# Patient Record
Sex: MALE | Race: BLACK OR AFRICAN AMERICAN | NOT HISPANIC OR LATINO | Employment: FULL TIME | URBAN - METROPOLITAN AREA
[De-identification: names, ages, dates, MRNs, and addresses within clinical notes are randomized per-mention and may not be internally consistent; named-entity substitution may affect disease eponyms.]

---

## 2020-07-17 ENCOUNTER — OFFICE VISIT (OUTPATIENT)
Dept: ORTHOPEDIC SURGERY | Facility: CLINIC | Age: 50
End: 2020-07-17

## 2020-07-17 VITALS — OXYGEN SATURATION: 100 % | HEIGHT: 71 IN | BODY MASS INDEX: 22.93 KG/M2 | WEIGHT: 163.8 LBS | HEART RATE: 61 BPM

## 2020-07-17 DIAGNOSIS — M25.512 LEFT SHOULDER PAIN, UNSPECIFIED CHRONICITY: Primary | ICD-10-CM

## 2020-07-17 PROCEDURE — 99203 OFFICE O/P NEW LOW 30 MIN: CPT | Performed by: ORTHOPAEDIC SURGERY

## 2020-07-17 RX ORDER — LISINOPRIL 20 MG/1
20 TABLET ORAL DAILY
COMMUNITY

## 2020-07-17 RX ORDER — IBUPROFEN 600 MG/1
600 TABLET ORAL EVERY 6 HOURS
COMMUNITY
Start: 2020-06-30

## 2020-07-17 RX ORDER — CYCLOBENZAPRINE HCL 5 MG
5 TABLET ORAL 3 TIMES DAILY
COMMUNITY
Start: 2020-06-30

## 2020-07-17 RX ORDER — HYDROCODONE BITARTRATE AND ACETAMINOPHEN 5; 325 MG/1; MG/1
1 TABLET ORAL EVERY 6 HOURS PRN
COMMUNITY
End: 2020-09-04

## 2020-07-17 NOTE — PROGRESS NOTES
[facility] [place] [facility] Note    Subjective     Chief Complaint   Patient presents with   • Left Shoulder - Pain        HPI  Neo Islas is a 49 y.o. male who presents with left shoulder pain.  Onset: twisting injury. The issue has been ongoing for 3 week(s). Pain is a 6/10 on the pain scale. Pain is described as throbbing. Associated symptoms include pain, swelling and stiffness. The pain is worse with walking, standing, sitting, sleeping, working and leisure; resting and pain medication and/or NSAID improve the pain. Previous treatments have included: NSAIDS.    I have reviewed the following portions of the patient's history:History of Present Illness        He injured it at work on June 27 and then also repetitive motion he says.    Past Medical History:   Diagnosis Date   • Diabetes (CMS/[facility])    • Hypertension       History reviewed. No pertinent surgical history.   Family History   Problem Relation Age of Onset   • Hypertension Mother    • Diabetes Mother    • Heart attack Mother    • Cancer Father    • Hypertension Father    • Diabetes Father      Social History     Socioeconomic History   • Marital status:      Spouse name: Not on file   • Number of children: Not on file   • Years of education: Not on file   • Highest education level: Not on file   Tobacco Use   • Smoking status: Current Every Day Smoker     Packs/day: 1.00     Types: Cigarettes     Start date: 1990   • Smokeless tobacco: Never Used   Substance and Sexual Activity   • Alcohol use: Never     Frequency: Never   • Drug use: Never   • Sexual activity: Defer      Current Outpatient Medications on File Prior to Visit   Medication Sig Dispense Refill   • cyclobenzaprine (FLEXERIL) 5 MG tablet Take 5 mg by mouth 3 (Three) Times a Day.     • HYDROcodone-acetaminophen (NORCO) 5-325 MG per tablet Take 1 tablet by mouth Every 6 (Six) Hours As Needed.     • ibuprofen (ADVIL,MOTRIN) 600 MG tablet Take 600 mg by mouth Every 6 (Six) Hours.    "  • lisinopril (PRINIVIL,ZESTRIL) 20 MG tablet Take 20 mg by mouth Daily.     • metFORMIN (GLUCOPHAGE) 1000 MG tablet Take 1,000 mg by mouth 2 (Two) Times a Day With Meals.       No current facility-administered medications on file prior to visit.       No Known Allergies     The following portions of the patient's history were reviewed and updated as appropriate: allergies, current medications, past family history, past medical history, past social history, past surgical history and problem list.    Review of Systems   Constitutional: Positive for activity change.   HENT: Negative.    Eyes: Negative.    Respiratory: Negative.    Cardiovascular: Negative.    Gastrointestinal: Negative.    Endocrine: Negative.    Genitourinary: Negative.    Musculoskeletal: Positive for arthralgias.   Skin: Negative.    Allergic/Immunologic: Negative.    Neurological: Negative.    Hematological: Negative.    Psychiatric/Behavioral: Negative.         Objective      Physical Exam  Pulse 61   Ht 181 cm (71.26\")   Wt 74.3 kg (163 lb 12.8 oz)   SpO2 100%   BMI 22.68 kg/m²     Body mass index is 22.68 kg/m².    GENERAL APPEARANCE: awake, alert & oriented x 3, in no acute distress and well developed, well nourished  PSYCH: normal mood and affect  LUNGS:  breathing nonlabored, no wheezing  EYES: sclera anicteric, pupils equal  CARDIOVASCULAR: palpable pulses dorsalis pedis, palpable posterior tibial bilaterally. Capillary refill less than 2 seconds  INTEGUMENTARY: skin intact, no clubbing, cyanosis  NEUROLOGIC:  Normal Sensation and reflexes       Ortho Exam  Musculoskeletal   Upper Extremity   Left Shoulder     Inspection and Palpation:     Tenderness - none     Crepitus - none    Sensation is normal    Examination reveals no ecchymosis.      Strength and Tone:    Supraspinatus, Infraspinatus - 4/5    Subscapularis - 5/5    Deltoid - 5/5   Range of Motion   Right shoulder:    Internal Rotation: ROM - T7    External Rotation: AROM - 80 " degrees    Elevation through flexion: AROM - 180 degrees     Abduction - 180   Left Shoulder:    Internal Rotation: ROM - T7    External Rotation: AROM - 80 degrees    Elevation through flexion: AROM - 180 degrees     Abduction - 180 degrees   Instability   Left shoulder    Sulcus sign negative    Apprehension test negative    Kalyani relocation test negative    Jerk test negative   Impingement   Left shoulder    Alfaro impingement test positive    Neer impingement test positive   Functional Testing   Left shoulder    AC crossover adduction test negative    Abdominal compression test negative    Lift-off sign negative    Speed's test negative    Liang's test negative    Horriblower's sign negative     Imaging/Studies  Imaging Results (Last 7 Days)     ** No results found for the last 168 hours. **        I viewed his x-rays from June 29 which are negative and his MRI from July 6 which shows a partial cuff tear and calcific tendinitis  Assessment/Plan        ICD-10-CM ICD-9-CM   1. Left shoulder pain, unspecified chronicity M25.512 719.41       Orders Placed This Encounter   Procedures   • Ambulatory Referral to Physical Therapy      He will go to physical therapy in [place].  I will order some Voltaren gel.  His work restrictions are no use left arm.  He works as a .  I will see her back in 4 weeks.  I offered him a cortisone shot but he does not want one.  Medical Decision Making  Management Options : prescription/IM medicine and physical/occupational therapy  Data/Risk: radiology tests and independent visualization of imaging, lab tests, or EMG/NCV    Yoni Cazares MD  07/17/20  11:24         EMR Dragon/Transcription disclaimer:  Much of this encounter note is an electronic transcription of spoken language to printed text. Electronic transcription of spoken language may permit erroneous, or at times, nonsensical words or phrases to be inadvertently transcribed. Although I have reviewed the note for  such errors, some may still exist.

## 2020-07-27 ENCOUNTER — TELEPHONE (OUTPATIENT)
Dept: ORTHOPEDIC SURGERY | Facility: CLINIC | Age: 50
End: 2020-07-27

## 2020-07-27 NOTE — TELEPHONE ENCOUNTER
PATIENT CALLED REGARDING PAIN MEDICATION. HE SAID HE CALLED HIS PRIMARY DOCTOR FOR A REFILL OF NORCO BUT THEY WILL NO LONGER PRESCRIBE IT BECAUSE DR MÁRQUEZ IS CARING FOR HIM NOW. PATIENT WANTS TO KNOW IF DR MÁRQUEZ CAN PRESCRIBE HIS MEDICATION NOW SINCE HIS PRIMARY WILL NOT. PATIENT CAN BE REACHED [phone].

## 2020-07-27 NOTE — TELEPHONE ENCOUNTER
PATIENT CALLED BACK & IS REQUESTING REGINALD TO CALL HIM BACK [phone]. HE PREVIOUSLY GAVE HIS WIFE'S PHONE NUMBER.

## 2020-07-28 NOTE — TELEPHONE ENCOUNTER
Patient called back in this morning. I let him know that Per Dr Cazares, he will not prescribe the Norco for him long term. That is typically only something that we prescribe after surgery for a short amount of time. He understood and will call his primary care back to get them to fill it for him. He had no further problems or questions at this time     Allison

## 2020-08-14 ENCOUNTER — OFFICE VISIT (OUTPATIENT)
Dept: ORTHOPEDIC SURGERY | Facility: CLINIC | Age: 50
End: 2020-08-14

## 2020-08-14 VITALS — BODY MASS INDEX: 22.93 KG/M2 | HEART RATE: 80 BPM | OXYGEN SATURATION: 99 % | HEIGHT: 71 IN | WEIGHT: 163.8 LBS

## 2020-08-14 DIAGNOSIS — M25.512 LEFT SHOULDER PAIN, UNSPECIFIED CHRONICITY: Primary | ICD-10-CM

## 2020-08-14 PROCEDURE — 99212 OFFICE O/P EST SF 10 MIN: CPT | Performed by: ORTHOPAEDIC SURGERY

## 2020-08-14 NOTE — PROGRESS NOTES
[facility] [place] [facility] Note    Subjective     Chief Complaint   Patient presents with   • Follow-up     1 month follow up - Left shoulder pain, unspecified chronicity        HPI  Neo Islas is a 49 y.o. male.  He says he is doing better.  He only had 6 sessions of physical therapy.  Pain is 3 out of 10.    Past Medical History:   Diagnosis Date   • Diabetes (CMS/HCC)    • Hypertension       No past surgical history on file.   Family History   Problem Relation Age of Onset   • Hypertension Mother    • Diabetes Mother    • Heart attack Mother    • Cancer Father    • Hypertension Father    • Diabetes Father      Social History     Socioeconomic History   • Marital status:      Spouse name: Not on file   • Number of children: Not on file   • Years of education: Not on file   • Highest education level: Not on file   Tobacco Use   • Smoking status: Current Every Day Smoker     Packs/day: 1.00     Types: Cigarettes     Start date: 1990   • Smokeless tobacco: Never Used   Substance and Sexual Activity   • Alcohol use: Never     Frequency: Never   • Drug use: Never   • Sexual activity: Defer      Current Outpatient Medications on File Prior to Visit   Medication Sig Dispense Refill   • cyclobenzaprine (FLEXERIL) 5 MG tablet Take 5 mg by mouth 3 (Three) Times a Day.     • diclofenac (VOLTAREN) 1 % gel gel Apply 4 g topically to the appropriate area as directed 2 (Two) Times a Day. 1 tube 5   • HYDROcodone-acetaminophen (NORCO) 5-325 MG per tablet Take 1 tablet by mouth Every 6 (Six) Hours As Needed.     • ibuprofen (ADVIL,MOTRIN) 600 MG tablet Take 600 mg by mouth Every 6 (Six) Hours.     • lisinopril (PRINIVIL,ZESTRIL) 20 MG tablet Take 20 mg by mouth Daily.     • metFORMIN (GLUCOPHAGE) 1000 MG tablet Take 1,000 mg by mouth 2 (Two) Times a Day With Meals.       No current facility-administered medications on file prior to visit.       No Known Allergies     The following portions of the patient's history  "were reviewed and updated as appropriate: allergies, current medications, past family history, past medical history, past social history, past surgical history and problem list.    Review of Systems   Constitutional: Negative.    HENT: Negative.    Eyes: Negative.    Respiratory: Negative.    Cardiovascular: Negative.    Gastrointestinal: Negative.    Endocrine: Negative.    Genitourinary: Negative.    Musculoskeletal: Positive for arthralgias and joint swelling.   Skin: Negative.    Allergic/Immunologic: Negative.    Neurological: Negative.    Hematological: Negative.    Psychiatric/Behavioral: Negative.         Objective      Physical Exam  Pulse 80   Ht 181 cm (71.26\")   Wt 74.3 kg (163 lb 12.8 oz)   SpO2 99%   BMI 22.68 kg/m²     Body mass index is 22.68 kg/m².    GENERAL APPEARANCE: awake, alert & oriented x 3, in no acute distress and well developed, well nourished  PSYCH: normal mood and affect  LUNGS:  breathing nonlabored, no wheezing  Left shoulder has full motion.  Positive impingement.  4+ out of 5 strength.    Imaging/Studies  Imaging Results (Last 7 Days)     ** No results found for the last 168 hours. **          Assessment/Plan        ICD-10-CM ICD-9-CM   1. Left shoulder pain, unspecified chronicity M25.512 719.41       Orders Placed This Encounter   Procedures   • Ambulatory Referral to Physical Therapy      He will continue physical therapy.  Follow-up in 3 weeks.  May return to work September 1.  Medical Decision Making  Management Options : over-the-counter medicine and physical/occupational therapy      Yoni Cazares MD  08/14/20  11:35         EMR Dragon/Transcription disclaimer:  Much of this encounter note is an electronic transcription of spoken language to printed text. Electronic transcription of spoken language may permit erroneous, or at times, nonsensical words or phrases to be inadvertently transcribed. Although I have reviewed the note for such errors, some may still " exist.

## 2020-08-17 ENCOUNTER — TELEPHONE (OUTPATIENT)
Dept: ORTHOPEDIC SURGERY | Facility: CLINIC | Age: 50
End: 2020-08-17

## 2020-09-04 ENCOUNTER — OFFICE VISIT (OUTPATIENT)
Dept: ORTHOPEDIC SURGERY | Facility: CLINIC | Age: 50
End: 2020-09-04

## 2020-09-04 VITALS — HEIGHT: 71 IN | HEART RATE: 86 BPM | WEIGHT: 163.8 LBS | BODY MASS INDEX: 22.93 KG/M2 | OXYGEN SATURATION: 99 %

## 2020-09-04 DIAGNOSIS — M25.512 LEFT SHOULDER PAIN, UNSPECIFIED CHRONICITY: Primary | ICD-10-CM

## 2020-09-04 PROCEDURE — 99212 OFFICE O/P EST SF 10 MIN: CPT | Performed by: ORTHOPAEDIC SURGERY

## 2020-09-04 NOTE — PROGRESS NOTES
[facility] [place] [facility] Note    Subjective     Chief Complaint   Patient presents with   • Follow-up     3 week follow up; Left shoulder pain        HPI  Neo Islas is a 50 y.o. male.  His left shoulder is doing better.  He has been doing physical therapy.  He believes he can return to work full duty.    Past Medical History:   Diagnosis Date   • Diabetes (CMS/HCC)    • Hypertension       History reviewed. No pertinent surgical history.   Family History   Problem Relation Age of Onset   • Hypertension Mother    • Diabetes Mother    • Heart attack Mother    • Cancer Father    • Hypertension Father    • Diabetes Father      Social History     Socioeconomic History   • Marital status:      Spouse name: Not on file   • Number of children: Not on file   • Years of education: Not on file   • Highest education level: Not on file   Tobacco Use   • Smoking status: Current Every Day Smoker     Packs/day: 1.00     Types: Cigarettes     Start date: 1990   • Smokeless tobacco: Never Used   Substance and Sexual Activity   • Alcohol use: Never     Frequency: Never   • Drug use: Never   • Sexual activity: Defer      Current Outpatient Medications on File Prior to Visit   Medication Sig Dispense Refill   • cyclobenzaprine (FLEXERIL) 5 MG tablet Take 5 mg by mouth 3 (Three) Times a Day.     • diclofenac (VOLTAREN) 1 % gel gel Apply 4 g topically to the appropriate area as directed 2 (Two) Times a Day. 1 tube 5   • ibuprofen (ADVIL,MOTRIN) 600 MG tablet Take 600 mg by mouth Every 6 (Six) Hours.     • lisinopril (PRINIVIL,ZESTRIL) 20 MG tablet Take 20 mg by mouth Daily.     • metFORMIN (GLUCOPHAGE) 1000 MG tablet Take 1,000 mg by mouth 2 (Two) Times a Day With Meals.     • [DISCONTINUED] HYDROcodone-acetaminophen (NORCO) 5-325 MG per tablet Take 1 tablet by mouth Every 6 (Six) Hours As Needed.       No current facility-administered medications on file prior to visit.       No Known Allergies     The following  "portions of the patient's history were reviewed and updated as appropriate: allergies, current medications, past family history, past medical history, past social history, past surgical history and problem list.    Review of Systems   Constitutional: Negative.    HENT: Negative.    Eyes: Negative.    Respiratory: Negative.    Cardiovascular: Negative.    Gastrointestinal: Negative.    Endocrine: Negative.    Genitourinary: Negative.    Musculoskeletal: Positive for arthralgias.   Skin: Negative.    Allergic/Immunologic: Negative.    Neurological: Negative.    Hematological: Negative.    Psychiatric/Behavioral: Negative.         Objective      Physical Exam  Pulse 86   Ht 181 cm (71.26\")   Wt 74.3 kg (163 lb 12.8 oz)   SpO2 99%   BMI 22.68 kg/m²     Body mass index is 22.68 kg/m².    GENERAL APPEARANCE: awake, alert & oriented x 3, in no acute distress and well developed, well nourished  PSYCH: normal mood and affect  LUNGS:  breathing nonlabored, no wheezing  Left shoulder with full motion and full strength  Imaging/Studies  Imaging Results (Last 7 Days)     ** No results found for the last 168 hours. **          Assessment/Plan        ICD-10-CM ICD-9-CM   1. Left shoulder pain, unspecified chronicity M25.512 719.41     He may return to work full duty without restriction on Tuesday, September 8.  I will see him back as needed.  Medical Decision Making  Management Options : over-the-counter medicine    Yoni Cazares MD  09/04/20  11:31         EMR Dragon/Transcription disclaimer:  Much of this encounter note is an electronic transcription of spoken language to printed text. Electronic transcription of spoken language may permit erroneous, or at times, nonsensical words or phrases to be inadvertently transcribed. Although I have reviewed the note for such errors, some may still exist.      "

## 2024-10-22 ENCOUNTER — APPOINTMENT (OUTPATIENT)
Dept: CARDIOLOGY | Facility: HOSPITAL | Age: 54
End: 2024-10-22
Payer: MEDICAID

## 2024-10-22 ENCOUNTER — APPOINTMENT (OUTPATIENT)
Dept: GENERAL RADIOLOGY | Facility: HOSPITAL | Age: 54
End: 2024-10-22
Payer: MEDICAID

## 2024-10-22 ENCOUNTER — HOSPITAL ENCOUNTER (EMERGENCY)
Facility: HOSPITAL | Age: 54
Discharge: LEFT BEFORE DISCHARGE | End: 2024-10-22
Attending: EMERGENCY MEDICINE | Admitting: EMERGENCY MEDICINE
Payer: MEDICAID

## 2024-10-22 VITALS
TEMPERATURE: 97.8 F | HEART RATE: 92 BPM | WEIGHT: 177 LBS | SYSTOLIC BLOOD PRESSURE: 149 MMHG | BODY MASS INDEX: 23.98 KG/M2 | DIASTOLIC BLOOD PRESSURE: 100 MMHG | OXYGEN SATURATION: 97 % | RESPIRATION RATE: 14 BRPM | HEIGHT: 72 IN

## 2024-10-22 DIAGNOSIS — D16.9 OSTEOCHONDROMA: ICD-10-CM

## 2024-10-22 DIAGNOSIS — Z53.29 LEFT AGAINST MEDICAL ADVICE: ICD-10-CM

## 2024-10-22 DIAGNOSIS — M79.604 PAIN OF RIGHT LOWER EXTREMITY: Primary | ICD-10-CM

## 2024-10-22 DIAGNOSIS — I70.209 SUPERFICIAL FEMORAL ARTERY OCCLUSION: ICD-10-CM

## 2024-10-22 LAB
ALBUMIN SERPL-MCNC: 3.1 G/DL (ref 3.5–5.2)
ALBUMIN/GLOB SERPL: 0.9 G/DL
ALP SERPL-CCNC: 124 U/L (ref 39–117)
ALT SERPL W P-5'-P-CCNC: 15 U/L (ref 1–41)
ANION GAP SERPL CALCULATED.3IONS-SCNC: 10 MMOL/L (ref 5–15)
AST SERPL-CCNC: 17 U/L (ref 1–40)
BASOPHILS # BLD AUTO: 0.07 10*3/MM3 (ref 0–0.2)
BASOPHILS NFR BLD AUTO: 0.7 % (ref 0–1.5)
BH CV GRAFT BRACHIAL PRESSURE LEFT: NORMAL MMHG
BH CV GRAFT BRACHIAL PRESSURE RIGHT: NORMAL MMHG
BH CV LEA LEFT DPA PRESSURE: 96 MMHG
BH CV LEA LEFT PTA PRESSURE: 114 MMHG
BH CV LEA RIGHT ANT TIBIAL A MID PSV: 19 CM/S
BH CV LEA RIGHT ANT TIBIAL A PROX PSV: 16.3 CM/S
BH CV LEA RIGHT CFA DISTAL PSV: 59.87 CM/S
BH CV LEA RIGHT CFA PROX PSV: 66.7 CM/S
BH CV LEA RIGHT DFA PROX PSV: 143.9 CM/S
BH CV LEA RIGHT DPA PRESSURE: NORMAL MMHG
BH CV LEA RIGHT PERONEAL  MID PSV: 11.9 CM/S
BH CV LEA RIGHT POPITEAL A  DISTAL PSV: 17.6 CM/S
BH CV LEA RIGHT POPITEAL A  PROX PSV: 13.8 CM/S
BH CV LEA RIGHT PTA DISTAL PSV: 22.3 CM/S
BH CV LEA RIGHT PTA MID PSV: 12.7 CM/S
BH CV LEA RIGHT PTA PRESSURE: 90 MMHG
BH CV LEA RIGHT PTA PROX PSV: 14 CM/S
BH CV LEA RIGHT SFA DISTAL PSV: 15.1 CM/S
BH CV LEA RIGHT SFA PROX PSV: 14.9 CM/S
BH CV LEA RIGHT TIBEOPERONEAL PSV: 21.93 CM/S
BH CV LOWER ARTERIAL LEFT ABI RATIO: 0.74
BH CV LOWER ARTERIAL RIGHT ABI RATIO: 0.58
BH CV LOWER VASCULAR RIGHT COMMON FEMORAL AUGMENT: NORMAL
BH CV LOWER VASCULAR RIGHT COMMON FEMORAL COMPRESS: NORMAL
BH CV LOWER VASCULAR RIGHT COMMON FEMORAL PHASIC: NORMAL
BH CV LOWER VASCULAR RIGHT COMMON FEMORAL SPONT: NORMAL
BH CV LOWER VASCULAR RIGHT DISTAL FEMORAL AUGMENT: NORMAL
BH CV LOWER VASCULAR RIGHT DISTAL FEMORAL COMPRESS: NORMAL
BH CV LOWER VASCULAR RIGHT DISTAL FEMORAL PHASIC: NORMAL
BH CV LOWER VASCULAR RIGHT DISTAL FEMORAL SPONT: NORMAL
BH CV LOWER VASCULAR RIGHT GASTRONEMIUS COMPRESS: NORMAL
BH CV LOWER VASCULAR RIGHT GREATER SAPH AK COMPRESS: NORMAL
BH CV LOWER VASCULAR RIGHT GREATER SAPH BK COMPRESS: NORMAL
BH CV LOWER VASCULAR RIGHT LESSER SAPH COMPRESS: NORMAL
BH CV LOWER VASCULAR RIGHT MID FEMORAL AUGMENT: NORMAL
BH CV LOWER VASCULAR RIGHT MID FEMORAL COMPRESS: NORMAL
BH CV LOWER VASCULAR RIGHT MID FEMORAL PHASIC: NORMAL
BH CV LOWER VASCULAR RIGHT MID FEMORAL SPONT: NORMAL
BH CV LOWER VASCULAR RIGHT PERONEAL AUGMENT: NORMAL
BH CV LOWER VASCULAR RIGHT PERONEAL COMPRESS: NORMAL
BH CV LOWER VASCULAR RIGHT POPLITEAL AUGMENT: NORMAL
BH CV LOWER VASCULAR RIGHT POPLITEAL COMPRESS: NORMAL
BH CV LOWER VASCULAR RIGHT POPLITEAL PHASIC: NORMAL
BH CV LOWER VASCULAR RIGHT POPLITEAL SPONT: NORMAL
BH CV LOWER VASCULAR RIGHT POSTERIOR TIBIAL AUGMENT: NORMAL
BH CV LOWER VASCULAR RIGHT POSTERIOR TIBIAL COMPRESS: NORMAL
BH CV LOWER VASCULAR RIGHT PROFUNDA FEMORAL AUGMENT: NORMAL
BH CV LOWER VASCULAR RIGHT PROFUNDA FEMORAL PHASIC: NORMAL
BH CV LOWER VASCULAR RIGHT PROFUNDA FEMORAL SPONT: NORMAL
BH CV LOWER VASCULAR RIGHT PROXIMAL FEMORAL AUGMENT: NORMAL
BH CV LOWER VASCULAR RIGHT PROXIMAL FEMORAL COMPRESS: NORMAL
BH CV LOWER VASCULAR RIGHT PROXIMAL FEMORAL PHASIC: NORMAL
BH CV LOWER VASCULAR RIGHT PROXIMAL FEMORAL SPONT: NORMAL
BH CV LOWER VASCULAR RIGHT SAPHENOFEMORAL JUNCTION AUGMENT: NORMAL
BH CV LOWER VASCULAR RIGHT SAPHENOFEMORAL JUNCTION COMPRESS: NORMAL
BH CV LOWER VASCULAR RIGHT SAPHENOFEMORAL JUNCTION PHASIC: NORMAL
BH CV LOWER VASCULAR RIGHT SAPHENOFEMORAL JUNCTION SPONT: NORMAL
BILIRUB SERPL-MCNC: <0.2 MG/DL (ref 0–1.2)
BUN SERPL-MCNC: 6 MG/DL (ref 6–20)
BUN/CREAT SERPL: 6.5 (ref 7–25)
CALCIUM SPEC-SCNC: 9.1 MG/DL (ref 8.6–10.5)
CHLORIDE SERPL-SCNC: 98 MMOL/L (ref 98–107)
CO2 SERPL-SCNC: 29 MMOL/L (ref 22–29)
CREAT SERPL-MCNC: 0.93 MG/DL (ref 0.76–1.27)
CRP SERPL-MCNC: 1.42 MG/DL (ref 0–0.5)
D-LACTATE SERPL-SCNC: 1.8 MMOL/L (ref 0.5–2)
DEPRECATED RDW RBC AUTO: 37.4 FL (ref 37–54)
EGFRCR SERPLBLD CKD-EPI 2021: 98.8 ML/MIN/1.73
EOSINOPHIL # BLD AUTO: 0.12 10*3/MM3 (ref 0–0.4)
EOSINOPHIL NFR BLD AUTO: 1.3 % (ref 0.3–6.2)
ERYTHROCYTE [DISTWIDTH] IN BLOOD BY AUTOMATED COUNT: 13.2 % (ref 12.3–15.4)
ERYTHROCYTE [SEDIMENTATION RATE] IN BLOOD: 84 MM/HR (ref 0–20)
GLOBULIN UR ELPH-MCNC: 3.6 GM/DL
GLUCOSE SERPL-MCNC: 200 MG/DL (ref 65–99)
HCT VFR BLD AUTO: 41.2 % (ref 37.5–51)
HGB BLD-MCNC: 13.1 G/DL (ref 13–17.7)
IMM GRANULOCYTES # BLD AUTO: 0.19 10*3/MM3 (ref 0–0.05)
IMM GRANULOCYTES NFR BLD AUTO: 2 % (ref 0–0.5)
LIPASE SERPL-CCNC: 31 U/L (ref 13–60)
LYMPHOCYTES # BLD AUTO: 2.14 10*3/MM3 (ref 0.7–3.1)
LYMPHOCYTES NFR BLD AUTO: 22.3 % (ref 19.6–45.3)
MCH RBC QN AUTO: 25 PG (ref 26.6–33)
MCHC RBC AUTO-ENTMCNC: 31.8 G/DL (ref 31.5–35.7)
MCV RBC AUTO: 78.6 FL (ref 79–97)
MONOCYTES # BLD AUTO: 0.44 10*3/MM3 (ref 0.1–0.9)
MONOCYTES NFR BLD AUTO: 4.6 % (ref 5–12)
NEUTROPHILS NFR BLD AUTO: 6.64 10*3/MM3 (ref 1.7–7)
NEUTROPHILS NFR BLD AUTO: 69.1 % (ref 42.7–76)
NRBC BLD AUTO-RTO: 0 /100 WBC (ref 0–0.2)
PLATELET # BLD AUTO: 648 10*3/MM3 (ref 140–450)
PMV BLD AUTO: 8 FL (ref 6–12)
POTASSIUM SERPL-SCNC: 4.2 MMOL/L (ref 3.5–5.2)
PROCALCITONIN SERPL-MCNC: 0.1 NG/ML (ref 0–0.25)
PROT SERPL-MCNC: 6.7 G/DL (ref 6–8.5)
RBC # BLD AUTO: 5.24 10*6/MM3 (ref 4.14–5.8)
RIGHT GROIN CFA SYS: 59.9 CM/SEC
SODIUM SERPL-SCNC: 137 MMOL/L (ref 136–145)
WBC NRBC COR # BLD AUTO: 9.6 10*3/MM3 (ref 3.4–10.8)

## 2024-10-22 PROCEDURE — 73560 X-RAY EXAM OF KNEE 1 OR 2: CPT

## 2024-10-22 PROCEDURE — 86140 C-REACTIVE PROTEIN: CPT | Performed by: NURSE PRACTITIONER

## 2024-10-22 PROCEDURE — 93971 EXTREMITY STUDY: CPT

## 2024-10-22 PROCEDURE — 83690 ASSAY OF LIPASE: CPT | Performed by: NURSE PRACTITIONER

## 2024-10-22 PROCEDURE — 63710000001 ONDANSETRON ODT 4 MG TABLET DISPERSIBLE: Performed by: EMERGENCY MEDICINE

## 2024-10-22 PROCEDURE — 36415 COLL VENOUS BLD VENIPUNCTURE: CPT

## 2024-10-22 PROCEDURE — 93926 LOWER EXTREMITY STUDY: CPT | Performed by: INTERNAL MEDICINE

## 2024-10-22 PROCEDURE — 80053 COMPREHEN METABOLIC PANEL: CPT | Performed by: NURSE PRACTITIONER

## 2024-10-22 PROCEDURE — 83605 ASSAY OF LACTIC ACID: CPT | Performed by: NURSE PRACTITIONER

## 2024-10-22 PROCEDURE — 99284 EMERGENCY DEPT VISIT MOD MDM: CPT

## 2024-10-22 PROCEDURE — 93926 LOWER EXTREMITY STUDY: CPT

## 2024-10-22 PROCEDURE — 93971 EXTREMITY STUDY: CPT | Performed by: INTERNAL MEDICINE

## 2024-10-22 PROCEDURE — 84145 PROCALCITONIN (PCT): CPT | Performed by: NURSE PRACTITIONER

## 2024-10-22 PROCEDURE — 85025 COMPLETE CBC W/AUTO DIFF WBC: CPT | Performed by: NURSE PRACTITIONER

## 2024-10-22 PROCEDURE — 85652 RBC SED RATE AUTOMATED: CPT | Performed by: NURSE PRACTITIONER

## 2024-10-22 RX ORDER — HYDROCODONE BITARTRATE AND ACETAMINOPHEN 5; 325 MG/1; MG/1
1 TABLET ORAL ONCE
Status: COMPLETED | OUTPATIENT
Start: 2024-10-22 | End: 2024-10-22

## 2024-10-22 RX ORDER — ONDANSETRON 4 MG/1
4 TABLET, ORALLY DISINTEGRATING ORAL ONCE
Status: COMPLETED | OUTPATIENT
Start: 2024-10-22 | End: 2024-10-22

## 2024-10-22 RX ADMIN — ONDANSETRON 4 MG: 4 TABLET, ORALLY DISINTEGRATING ORAL at 17:09

## 2024-10-22 RX ADMIN — HYDROCODONE BITARTRATE AND ACETAMINOPHEN 1 TABLET: 5; 325 TABLET ORAL at 17:09

## 2024-10-22 NOTE — ED PROVIDER NOTES
EMERGENCY DEPARTMENT ENCOUNTER    Pt Name: Neo Islas  MRN: [id number]  Pt :   1970  Room Number:    Date of encounter:  10/22/2024  PCP: Garth Robbins MD  ED Provider: DONNA Negro    Historian: Patient, spouse      HPI:  Chief Complaint: Right lower extremity pain        Context: Neo Islas is a 54 y.o. male who presents to the ED c/o right lower extremity pain since 10 days ago.  Patient states pain constant, moderate to severe, initiates at the medial knee to the medial upper thigh.  Pain is worse at night and keeps him awake.  He started to use a cane to help with ambulation.  Reports recent hospital admission to hospital in [place] for pancreatitis.  Pain leg started just before discharge from the hospital, US was negative for DVT  He reports no injury.  Had fever over weekend.  Abdominal pain significantly improved and very mild.  Reports no recent alcohol use.  He is a current smoker      PAST MEDICAL HISTORY  Past Medical History:   Diagnosis Date    Diabetes     Hypertension          PAST SURGICAL HISTORY  History reviewed. No pertinent surgical history.      FAMILY HISTORY  Family History   Problem Relation Age of Onset    Hypertension Mother     Diabetes Mother     Heart attack Mother     Cancer Father     Hypertension Father     Diabetes Father          SOCIAL HISTORY  Social History     Socioeconomic History    Marital status:    Tobacco Use    Smoking status: Every Day     Current packs/day: 1.00     Average packs/day: 1 pack/day for 34.8 years (34.8 ttl pk-yrs)     Types: Cigarettes     Start date:     Smokeless tobacco: Never   Vaping Use    Vaping status: Never Used   Substance and Sexual Activity    Alcohol use: Never    Drug use: Never    Sexual activity: Defer         ALLERGIES  Patient has no known allergies.        REVIEW OF SYSTEMS  Review of Systems       All systems reviewed and negative except for those discussed in HPI.       PHYSICAL EXAM    I  have reviewed the triage vital signs and nursing notes.    ED Triage Vitals [10/22/24 1323]   Temp Heart Rate Resp BP SpO2   97.8 °F (36.6 °C) 92 14 149/100 97 %      Temp src Heart Rate Source Patient Position BP Location FiO2 (%)   Oral Monitor Sitting Left arm --       Physical Exam  GENERAL:   Appears in no acute distress.   HENT: Nares patent.  EYES: No scleral icterus.  CV: Regular rhythm, regular rate.  RESPIRATORY: Normal effort.  No audible wheezes, rales or rhonchi.  ABDOMEN: Soft, nontender  MUSCULOSKELETAL: No deformities.  Tenderness on palpation right medial thigh, no crepitus, no rash, no palpable cord.  Distal pulses are present, femoral pulse present, no erythema to the right knee.  NEURO: Alert, moves all extremities, follows commands.  SKIN: Warm, dry, no rash visualized.      LAB RESULTS  Recent Results (from the past 24 hours)   CBC Auto Differential    Collection Time: 10/22/24  1:57 PM    Specimen: Blood   Result Value Ref Range    WBC 9.60 3.40 - 10.80 10*3/mm3    RBC 5.24 4.14 - 5.80 10*6/mm3    Hemoglobin 13.1 13.0 - 17.7 g/dL    Hematocrit 41.2 37.5 - 51.0 %    MCV 78.6 (L) 79.0 - 97.0 fL    MCH 25.0 (L) 26.6 - 33.0 pg    MCHC 31.8 31.5 - 35.7 g/dL    RDW 13.2 12.3 - 15.4 %    RDW-SD 37.4 37.0 - 54.0 fl    MPV 8.0 6.0 - 12.0 fL    Platelets 648 (H) 140 - 450 10*3/mm3    Neutrophil % 69.1 42.7 - 76.0 %    Lymphocyte % 22.3 19.6 - 45.3 %    Monocyte % 4.6 (L) 5.0 - 12.0 %    Eosinophil % 1.3 0.3 - 6.2 %    Basophil % 0.7 0.0 - 1.5 %    Immature Grans % 2.0 (H) 0.0 - 0.5 %    Neutrophils, Absolute 6.64 1.70 - 7.00 10*3/mm3    Lymphocytes, Absolute 2.14 0.70 - 3.10 10*3/mm3    Monocytes, Absolute 0.44 0.10 - 0.90 10*3/mm3    Eosinophils, Absolute 0.12 0.00 - 0.40 10*3/mm3    Basophils, Absolute 0.07 0.00 - 0.20 10*3/mm3    Immature Grans, Absolute 0.19 (H) 0.00 - 0.05 10*3/mm3    nRBC 0.0 0.0 - 0.2 /100 WBC   Lactic Acid, Plasma    Collection Time: 10/22/24  1:57 PM    Specimen: Blood   Result  Value Ref Range    Lactate 1.8 0.5 - 2.0 mmol/L   Sedimentation Rate    Collection Time: 10/22/24  1:57 PM    Specimen: Blood   Result Value Ref Range    Sed Rate 84 (H) 0 - 20 mm/hr   Comprehensive Metabolic Panel    Collection Time: 10/22/24  2:52 PM    Specimen: Blood   Result Value Ref Range    Glucose 200 (H) 65 - 99 mg/dL    BUN 6 6 - 20 mg/dL    Creatinine 0.93 0.76 - 1.27 mg/dL    Sodium 137 136 - 145 mmol/L    Potassium 4.2 3.5 - 5.2 mmol/L    Chloride 98 98 - 107 mmol/L    CO2 29.0 22.0 - 29.0 mmol/L    Calcium 9.1 8.6 - 10.5 mg/dL    Total Protein 6.7 6.0 - 8.5 g/dL    Albumin 3.1 (L) 3.5 - 5.2 g/dL    ALT (SGPT) 15 1 - 41 U/L    AST (SGOT) 17 1 - 40 U/L    Alkaline Phosphatase 124 (H) 39 - 117 U/L    Total Bilirubin <0.2 0.0 - 1.2 mg/dL    Globulin 3.6 gm/dL    A/G Ratio 0.9 g/dL    BUN/Creatinine Ratio 6.5 (L) 7.0 - 25.0    Anion Gap 10.0 5.0 - 15.0 mmol/L    eGFR 98.8 >60.0 mL/min/1.73   Procalcitonin    Collection Time: 10/22/24  2:52 PM    Specimen: Blood   Result Value Ref Range    Procalcitonin 0.10 0.00 - 0.25 ng/mL   C-reactive Protein    Collection Time: 10/22/24  2:52 PM    Specimen: Blood   Result Value Ref Range    C-Reactive Protein 1.42 (H) 0.00 - 0.50 mg/dL   Lipase    Collection Time: 10/22/24  2:52 PM    Specimen: Blood   Result Value Ref Range    Lipase 31 13 - 60 U/L   Duplex Venous Lower Extremity - Right    Collection Time: 10/22/24  4:50 PM   Result Value Ref Range    Right Common Femoral Spont Y     Right Common Femoral Phasic Y     Right Common Femoral Compress C     Right Common Femoral Augment Y     Right Saphenofemoral Junction Spont Y     Right Saphenofemoral Junction Phasic Y     Right Saphenofemoral Junction Compress C     Right Saphenofemoral Junction Augment Y     Right Profunda Femoral Spont Y     Right Profunda Femoral Phasic Y     Right Profunda Femoral Augment Y     Right Proximal Femoral Spont Y     Right Proximal Femoral Phasic Y     Right Proximal Femoral Compress C      Right Proximal Femoral Augment Y     Right Mid Femoral Spont Y     Right Mid Femoral Phasic Y     Right Mid Femoral Compress C     Right Mid Femoral Augment Y     Right Distal Femoral Spont Y     Right Distal Femoral Phasic Y     Right Distal Femoral Compress C     Right Distal Femoral Augment Y     Right Popliteal Spont Y     Right Popliteal Phasic Y     Right Popliteal Compress C     Right Popliteal Augment Y     Right Posterior Tibial Compress C     Right Posterior Tibial Augment Y     Right Peroneal Compress C     Right Peroneal Augment Y     Right Gastronemius Compress C     Right Greater Saph AK Compress C     Right Greater Saph BK Compress C     Right Lesser Saph Compress C    Duplex Lower Extremity Art / Grafts - Right    Collection Time: 10/22/24  5:03 PM   Result Value Ref Range    Ant Tibial A Mid PSV-Right 19.0 cm/s    Ant Tibial A Prox PSV-Right 16.3 cm/s    Peroneal Mid PSV-Right 11.9 cm/s    PTA Distal PSV-Right 22.3 cm/s    PTA Mid PSV-Right 12.7 cm/s    Popiteal A Distal PSV-Right 17.6 cm/s    Popiteal A Prox PSV-Right 13.8 cm/s    SFA Distal PSV-Right 15.1 cm/s    SFA Prox PSV-Right 14.9 cm/s    Right groin CFA sys 59.9 cm/sec    CFA Prox PSV-Right 66.70 cm/s    CFA Distal PSV-Right 59.87 cm/s    DFA Prox PSV-Right 143.90 cm/s    Rt. Tibeoperoneal PSV 21.93 cm/s    PTA Prox PSV-Right 14.00 cm/s    Right Brachial Pressure 152/94 mmHg    Left Brachial Pressure 155/100 mmHg    RIGHT PTA PRESSURE 90 mmHg    LEFT PTA PRESSURE 114 mmHg    RIGHT DPA PRESSURE Occluded mmHg    LEFT DPA PRESSURE 96 mmHg    RIGHT SHELLEY RATIO 0.58     LEFT SHELLEY RATIO 0.74        If labs were ordered, I independently reviewed the results and considered them in treating the patient.        RADIOLOGY  Duplex Lower Extremity Art / Grafts - Right    Result Date: 10/22/2024    The right proximal superficial femoral artery is occluded.   Additional findings: Occluded Right Femoral Artery Prox and Mid. Flow resumes from collaterals  Mid-Dist Femoral Artery.     Duplex Venous Lower Extremity - Right    Result Date: 10/22/2024    Normal right lower extremity venous duplex scan.     XR Knee 1 or 2 View Right    Result Date: 10/22/2024  XR KNEE 1 OR 2 VW RIGHT Date of Exam: 10/22/2024 3:04 PM EDT Indication: RLE pain Comparison: None available. Findings: 2 views. There is moderate spurring from the anterior superior and anterior inferior pole of the patella. There is a small nonunited tibial tubercle as an anatomic variation. Joint compartments are maintained and are normally aligned. There is a small bony excrescence from the distal medial femoral shaft most compatible with an osteochondroma. There is atherosclerotic vascular calcification in the distal thigh.     Impression: Patellar spurring. Probable osteochondroma. Mild atherosclerosis. Electronically Signed: Amaya Alexander MD  10/22/2024 3:21 PM EDT  Workstation ID: [id number]     I ordered and independently reviewed the above noted radiographic studies.        PROCEDURES    Procedures    No orders to display       MEDICATIONS GIVEN IN ER    Medications   HYDROcodone-acetaminophen (NORCO) 5-325 MG per tablet 1 tablet (1 tablet Oral Given 10/22/24 1709)   ondansetron ODT (ZOFRAN-ODT) disintegrating tablet 4 mg (4 mg Oral Given 10/22/24 1709)         MEDICAL DECISION MAKING, PROGRESS, and CONSULTS    All labs, if obtained, have been independently reviewed by me.  All radiology studies, if obtained, have been reviewed by me and the radiologist dictating the report.  All EKG's, if obtained, have been independently viewed and interpreted by me/my attending physician.      Discussion below represents my analysis of pertinent findings related to patient's condition, differential diagnosis, treatment plan and final disposition.  Patient is 54-year-old male who presents for evaluation of right lower extremity pain.  On physical exam he was nontoxic-appearing, no deformity to the extremity, no  peripheral edema, good femoral and distal pulses, extremity was tender on palpation lower medial thigh.  Lab work was obtained and was significant for mildly elevated CRP, normal white count and elevated sed rate of 84, normal lactate, negative lipase.  Arterial duplex lower extremity showing right proximal superficial femoral artery occlusion, Doppler negative for DVT, x-ray of the knee showing patellar spurring possible osteochondroma and mild atherosclerosis.  CT abdomen with runoffs was ordered.  Patient declined additional testing.  He was tired and hungry and wanted to leave.   Pt was counseled on risks of  Femoral artery occlusion. He saw Dr. Guillaume last year for leg edema.  Outpatient follow-up with vascular and orthopedic specialist provided on discharge.  Discussed return precautions.                       Differential diagnosis:    Femoral vein occlusion, arthritis, osteochondroma, DVT, lymphadenopathy, lymphoma      Additional sources:    - Discussed/ obtained information from independent historians: Spouse    - External (non-ED) record review:      - Chronic or social conditions impacting care: Smoking    - Shared decision making: Patient, spouse      Orders placed during this visit:  Orders Placed This Encounter   Procedures    XR Knee 1 or 2 View Right    CBC Auto Differential    Comprehensive Metabolic Panel    Lactic Acid, Plasma    Sedimentation Rate    Procalcitonin    C-reactive Protein    Lipase    aPTT    Protime-INR    Ambulatory Referral to Vascular Surgery    Ambulatory Referral to Orthopedic Surgery         Additional orders considered but not ordered:      ED Course:    Consultants:      ED Course as of 10/22/24 1915   Tue Oct 22, 2024   1734 Right SHELLEY .58 and Left SHELLEY .74 His Right Femoral artery is occluded from Prox to Mid section. Flow resumes Mid-Distal Femoral Artery from collaterals. Monophasic waveforms from Right Dist Fem A to PTA. Right Distal LEIGHA and DPA is Occluded. [IR]    1734 CTA abd with run offs ordered [IR]   1734 Negative DVT [IR]   1900 Patient wishes to leave.  I counseled him on risk of leaving. [IR]      ED Course User Index  [IR] Kaykay Moses APRN              Shared Decision Making:  After my consideration of clinical presentation and any laboratory/radiology studies obtained, I discussed the findings with the patient/patient representative who is in agreement with the treatment plan and the final disposition.   Risks and benefits of discharge and/or observation/admission were discussed.       AS OF 19:15 EDT VITALS:    BP - 149/100  HR - 92  TEMP - 97.8 °F (36.6 °C) (Oral)  O2 SATS - 97%                  DIAGNOSIS  Final diagnoses:   Pain of right lower extremity   Superficial femoral artery occlusion   Osteochondroma   Left against medical advice         DISPOSITION  DISCHARGE    Patient discharged in stable condition.    Reviewed implications of results, diagnosis, meds, responsibility to follow up, warning signs and symptoms of possible worsening, potential complications and reasons to return to ER.    Patient/Family voiced understanding of above instructions.    Discussed plan for discharge, as there is no emergent indication for admission.  Pt/family is agreeable and understands need for follow up and possible repeat testing.  Pt/family is aware that discharge does not mean that nothing is wrong but that it indicates no emergency is currently present that requires admission and they must continue care with follow-up as given below or with a physician of their choice.     FOLLOW-UP  Junior Romo  [address] [place] [address] [phone]    Call in 1 day           Medication List      No changes were made to your prescriptions during this visit.            Please note that portions of this document were completed with voice recognition software.     DONNA Negro   10/22/24   19:15 EDT        Kaykay Moses APRN  10/22/24 1915